# Patient Record
Sex: FEMALE | Race: WHITE | Employment: FULL TIME | URBAN - METROPOLITAN AREA
[De-identification: names, ages, dates, MRNs, and addresses within clinical notes are randomized per-mention and may not be internally consistent; named-entity substitution may affect disease eponyms.]

---

## 2019-05-20 ENCOUNTER — TELEPHONE (OUTPATIENT)
Dept: ORTHOPEDIC SURGERY | Age: 39
End: 2019-05-20

## 2019-05-20 NOTE — TELEPHONE ENCOUNTER
Patient would like to schedule for left foot fx with dr Pito Granado referred by dr Trisha benito. Please call patient.

## 2019-05-29 ENCOUNTER — OFFICE VISIT (OUTPATIENT)
Dept: ORTHOPEDIC SURGERY | Age: 39
End: 2019-05-29
Payer: COMMERCIAL

## 2019-05-29 VITALS
HEART RATE: 75 BPM | BODY MASS INDEX: 22.2 KG/M2 | SYSTOLIC BLOOD PRESSURE: 119 MMHG | HEIGHT: 64 IN | WEIGHT: 130 LBS | RESPIRATION RATE: 16 BRPM | DIASTOLIC BLOOD PRESSURE: 88 MMHG

## 2019-05-29 DIAGNOSIS — M92.72 FREIBERG'S INFRACTION, LEFT: Primary | ICD-10-CM

## 2019-05-29 DIAGNOSIS — M79.672 LEFT FOOT PAIN: ICD-10-CM

## 2019-05-29 PROCEDURE — G8420 CALC BMI NORM PARAMETERS: HCPCS | Performed by: ORTHOPAEDIC SURGERY

## 2019-05-29 PROCEDURE — 99203 OFFICE O/P NEW LOW 30 MIN: CPT | Performed by: ORTHOPAEDIC SURGERY

## 2019-05-29 PROCEDURE — 4004F PT TOBACCO SCREEN RCVD TLK: CPT | Performed by: ORTHOPAEDIC SURGERY

## 2019-05-29 PROCEDURE — G8428 CUR MEDS NOT DOCUMENT: HCPCS | Performed by: ORTHOPAEDIC SURGERY

## 2019-05-29 RX ORDER — LIOTHYRONINE SODIUM 25 UG/1
1 TABLET ORAL
COMMUNITY
Start: 2018-06-28

## 2019-05-29 RX ORDER — LEVOTHYROXINE SODIUM 0.2 MG/1
TABLET ORAL
COMMUNITY
Start: 2016-04-25

## 2019-05-29 RX ORDER — HYDROXYZINE HYDROCHLORIDE 25 MG/1
25 TABLET, FILM COATED ORAL
COMMUNITY
Start: 2017-10-04 | End: 2020-08-28 | Stop reason: ALTCHOICE

## 2019-05-29 RX ORDER — NAPROXEN 500 MG/1
500 TABLET ORAL 2 TIMES DAILY WITH MEALS
Qty: 60 TABLET | Refills: 0 | Status: SHIPPED | OUTPATIENT
Start: 2019-05-29 | End: 2019-06-24 | Stop reason: SDUPTHER

## 2019-05-29 RX ORDER — LITHIUM CARBONATE 300 MG/1
CAPSULE ORAL EVERY EVENING
COMMUNITY
Start: 2016-01-25

## 2019-05-29 RX ORDER — CIPROFLOXACIN 500 MG/1
500 TABLET, FILM COATED ORAL
COMMUNITY
Start: 2017-07-05 | End: 2020-08-28 | Stop reason: ALTCHOICE

## 2019-05-29 RX ORDER — IPRATROPIUM BROMIDE AND ALBUTEROL SULFATE 2.5; .5 MG/3ML; MG/3ML
SOLUTION RESPIRATORY (INHALATION)
COMMUNITY
Start: 2016-11-14

## 2019-05-29 RX ORDER — OXYCODONE HYDROCHLORIDE AND ACETAMINOPHEN 5; 325 MG/1; MG/1
TABLET ORAL
COMMUNITY
Start: 2018-07-09 | End: 2019-06-03

## 2019-05-29 RX ORDER — GABAPENTIN 300 MG/1
CAPSULE ORAL
Status: ON HOLD | COMMUNITY
Start: 2018-06-06 | End: 2020-09-01

## 2019-05-29 RX ORDER — ATORVASTATIN CALCIUM 80 MG/1
TABLET, FILM COATED ORAL
COMMUNITY
Start: 2016-04-25

## 2019-05-29 RX ORDER — LEVONORGESTREL AND ETHINYL ESTRADIOL 0.1-0.02MG
KIT ORAL
COMMUNITY
Start: 2015-07-26 | End: 2020-08-28

## 2019-05-29 RX ORDER — CLONAZEPAM 0.5 MG/1
TABLET ORAL
COMMUNITY
Start: 2016-04-24 | End: 2020-08-28

## 2019-05-29 RX ORDER — LEVOTHYROXINE SODIUM 112 UG/1
112 TABLET ORAL
COMMUNITY
Start: 2014-12-08 | End: 2020-08-28

## 2019-05-29 RX ORDER — DOXEPIN HYDROCHLORIDE 25 MG/1
CAPSULE ORAL
COMMUNITY
Start: 2018-06-27

## 2019-05-29 RX ORDER — FLUTICASONE FUROATE AND VILANTEROL 100; 25 UG/1; UG/1
POWDER RESPIRATORY (INHALATION)
COMMUNITY
Start: 2018-06-29 | End: 2020-08-28 | Stop reason: ALTCHOICE

## 2019-05-29 RX ORDER — GABAPENTIN 100 MG/1
100 CAPSULE ORAL 2 TIMES DAILY
COMMUNITY
Start: 2018-05-08 | End: 2020-08-28

## 2019-05-29 RX ORDER — TRAZODONE HYDROCHLORIDE 100 MG/1
100 TABLET ORAL NIGHTLY
COMMUNITY
Start: 2016-01-25

## 2019-05-29 RX ORDER — LIDOCAINE 50 MG/G
PATCH TOPICAL
COMMUNITY
Start: 2018-04-26

## 2019-05-29 RX ORDER — FOLIC ACID 1 MG/1
2 TABLET ORAL
COMMUNITY
Start: 2015-03-31

## 2019-05-29 RX ORDER — CALCIPOTRIENE 50 UG/G
CREAM TOPICAL
COMMUNITY
Start: 2018-03-23

## 2019-05-29 RX ORDER — LEVOTHYROXINE AND LIOTHYRONINE 57; 13.5 UG/1; UG/1
1 TABLET ORAL
COMMUNITY
Start: 2018-11-08 | End: 2020-08-28 | Stop reason: ALTCHOICE

## 2019-05-29 RX ORDER — ALBUTEROL SULFATE 90 UG/1
AEROSOL, METERED RESPIRATORY (INHALATION)
COMMUNITY
Start: 2014-04-28

## 2019-05-29 RX ORDER — LEVOTHYROXINE SODIUM 300 UG/1
TABLET ORAL
COMMUNITY
Start: 2015-04-05 | End: 2020-08-28 | Stop reason: ALTCHOICE

## 2019-05-29 RX ORDER — BACLOFEN 10 MG/1
10 TABLET ORAL
COMMUNITY
Start: 2017-01-30 | End: 2020-08-28 | Stop reason: ALTCHOICE

## 2019-05-29 RX ORDER — LANOLIN ALCOHOL/MO/W.PET/CERES
1 CREAM (GRAM) TOPICAL
COMMUNITY
Start: 2018-11-18

## 2019-05-29 RX ORDER — LEVETIRACETAM 500 MG/1
TABLET ORAL
COMMUNITY
Start: 2015-03-30

## 2019-05-29 RX ORDER — POTASSIUM CHLORIDE 20 MEQ/1
TABLET, EXTENDED RELEASE ORAL
COMMUNITY
Start: 2019-05-12

## 2019-05-29 SDOH — HEALTH STABILITY: MENTAL HEALTH: HOW OFTEN DO YOU HAVE A DRINK CONTAINING ALCOHOL?: NEVER

## 2019-05-29 NOTE — PROGRESS NOTES
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 HISTORY:  Ms. Hartman Record 44 y.o.  female presents today for the first visit for evaluation of left forefoot pain which started to worsen 1 month ago. She started a new standing job at [employer] when the pain worsened. She initially presented to [facility] where she was Xray'd and instructed to f/u with orthopedics. She is complaining of sharp  pain. Rates pain a 9/10 VAS and is worsening. Pain is increase with standing and walking. No numbness and tingling sensation. No other complaint. No past medical history on file. No past surgical history on file.     Social History     Socioeconomic History    Marital status: Single     Spouse name: Not on file    Number of children: Not on file    Years of education: Not on file    Highest education level: Not on file   Occupational History    Not on file   Social Needs    Financial resource strain: Not on file    Food insecurity:     Worry: Not on file     Inability: Not on file    Transportation needs:     Medical: Not on file     Non-medical: Not on file   Tobacco Use    Smoking status: Current Every Day Smoker    Smokeless tobacco: Never Used   Substance and Sexual Activity    Alcohol use: Never     Frequency: Never    Drug use: Never    Sexual activity: Not on file   Lifestyle    Physical activity:     Days per week: Not on file     Minutes per session: Not on file    Stress: Not on file   Relationships    Social connections:     Talks on phone: Not on file     Gets together: Not on file     Attends Pent[employer]alism service: Not on file     Active member of club or organization: Not on file     Attends meetings of clubs or organizations: Not on file     Relationship status: Not on file    Intimate partner violence:     Fear of current or ex partner: Not on file     Emotionally abused: Not on file     Physically abused: Not on file     Forced sexual activity: Not on file   Other Topics Concern    Not on file   Social History Narrative    Not on file       No family history on file. Current Outpatient Medications on File Prior to Visit   Medication Sig Dispense Refill    albuterol sulfate HFA (VENTOLIN HFA) 108 (90 Base) MCG/ACT inhaler INHALE TWO PUFFS BY MOUTH EVERY 6 HOURS AS NEEDED FOR WHEEZING OR FOR SHORTNESS OF BREATH      atorvastatin (LIPITOR) 80 MG tablet TAKE ONE TABLET BY MOUTH DAILY      baclofen (LIORESAL) 10 MG tablet Take 10 mg by mouth      calcipotriene (DOVONEX) 0.005 % cream APPLY 1-2 GRAMS TOPICALLY TO AFFECTED AREA 1-2 TIMES PER DAY. DO NOT APPLY TO FACE.  ciprofloxacin (CIPRO) 500 MG tablet Take 500 mg by mouth      clonazePAM (KLONOPIN) 0.5 MG tablet TAKE ONE-HALF TO ONE TABLET BY MOUTH UP TO TWO TIMES A DAY AS NEEDED  NO MORE THAN 1.5 TABLETS PER DAY OR 40 PER MONTH      doxepin (SINEQUAN) 25 MG capsule       fluticasone-vilanterol (BREO ELLIPTA) 100-25 MCG/INH AEPB inhaler       folic acid (FOLVITE) 1 MG tablet Take 2 mg by mouth      gabapentin (NEURONTIN) 100 MG capsule Take 100 mg by mouth.  gabapentin (NEURONTIN) 300 MG capsule Take two each evening.       hydrOXYzine (ATARAX) 25 MG tablet Take 25 mg by mouth      ipratropium-albuterol (DUONEB) 0.5-2.5 (3) MG/3ML SOLN nebulizer solution INHALE ONE VIAL VIA NEBULIZER BY MOUTH EVERY 6 HOURS AS NEEDED FOR WHEEZING      levETIRAcetam (KEPPRA) 500 MG tablet TAKE ONE TABLET BY MOUTH TWICE A DAY      levonorgestrel-ethinyl estradiol (ORSYTHIA) 0.1-20 MG-MCG per tablet TAKE ONE TABLET BY MOUTH ONE TIME A DAY FOR IRREGULAR MENSES      levothyroxine (SYNTHROID) 112 MCG tablet Take 112 mcg by mouth      levothyroxine (SYNTHROID) 200 MCG tablet       levothyroxine (SYNTHROID) 300 MCG tablet       lidocaine (LIDODERM) 5 % APPLY ONE PATCH TOPICALLY DAILY FOR 12 HOURS IN A 24 HOUR PERIOD      liothyronine (CYTOMEL) 25 MCG tablet Take 1 tablet by mouth      lithium 300 MG capsule Take by mouth      melatonin 3 MG TABS tablet Take 1 tablet by mouth      oxyCODONE-acetaminophen (PERCOCET) 5-325 MG per tablet Take 1/2 - 1 up to twice daily as needed for severe pain. Should last 30 days      potassium chloride (KLOR-CON M) 20 MEQ extended release tablet TAKE ONE TABLET BY MOUTH TWICE A DAY      thyroid (NP THYROID) 90 MG tablet Take 1 tablet by mouth      traZODone (DESYREL) 100 MG tablet Take 100 mg by mouth       No current facility-administered medications on file prior to visit. Pertinent items are noted in HPI  Review of systems reviewed from Patient History Form dated on 5/29/2019 and available in the patient's chart under the Media tab. No change noted. PHYSICAL EXAMINATION:  Ms. Yamileth La is a very pleasant 44 y.o.  female who presents today in no acute distress, awake, alert, and oriented. She is well dressed, nourished and  groomed. Patient with normal affect. Height is  5' 4\" (1.626 m), weight is 130 lb (59 kg), Body mass index is 22.31 kg/m². Resting respiratory rate is 16. Examination of the gait, showed that the patient walks with a limp with minimal pressure applied to left forefoot.  Examination of both ankles showing a good range of motion.  She has dorsiflexion to about 5 degrees bilaterally, which increased with knee flexion. She has intact sensation and good pedal pulses.  She has good strength in all four planes, including eversion, and has moderate tenderness on deep palpation over the left 2nd metatarsal head, without instability compared to the other side.  The ankles are stable to drawer test bilaterally, ankle reflex 1+ equally bilaterally.       IMAGING: Xray's were reviewed.  3 views of the left foot taken at [facility] on 5/18/2019, and showed no acute fracture. There are 2nd MT head joint changes consistent with Freiberg's infraction.     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 PLAN: I discussed with the patient the treatment options. We recommended stretching exercises of the calf which was taught to the patient today. She will take NSAID as needed. Use soft orthosis, with metatarsal pad. Follow up in 6 weeks and will consider Cortisone injection if needed.        Evi Burton MD

## 2019-05-29 NOTE — LETTER
[facility]  [address]  [address] [place] [address]  Phone: [phone]  Fax: [phone]    Jose Gilmore MD        May 29, 2019     Patient: Maria R Li   YOB: 1980   Date of Visit: 5/29/2019       To Whom It May Concern: It is my medical opinion that Gertrude Salcido may return to work on 06/03/2019 with no restrictions. If you have any questions or concerns, please don't hesitate to call.     Sincerely,    Jose Gilmore MD

## 2019-06-01 PROBLEM — M92.72 FREIBERG'S INFRACTION, LEFT: Status: ACTIVE | Noted: 2019-06-01

## 2019-06-14 ENCOUNTER — TELEPHONE (OUTPATIENT)
Dept: ORTHOPEDIC SURGERY | Age: 39
End: 2019-06-14

## 2019-06-14 NOTE — TELEPHONE ENCOUNTER
Patient was seen 5/29. Patient says it seems her foot has gotten worse not better. Patient says she's been using cold/hot therapy. Patient says her foot is a little swollen and also bruised. Patient has also tried \"exercises\" but her foot is still hurting. Patient has been using the pain medication (Percocet)  prescribed from her internist.     Are there any other exercises, treatment options available? Please advise.

## 2019-06-14 NOTE — TELEPHONE ENCOUNTER
Called and spoke to patient. patient is wanting to proceed with injection appt made for 06/26 at 0830

## 2019-06-24 DIAGNOSIS — M79.672 LEFT FOOT PAIN: ICD-10-CM

## 2019-06-25 RX ORDER — NAPROXEN 500 MG/1
TABLET ORAL
Qty: 60 TABLET | Refills: 0 | Status: SHIPPED | OUTPATIENT
Start: 2019-06-25 | End: 2019-07-23 | Stop reason: SDUPTHER

## 2019-06-26 ENCOUNTER — OFFICE VISIT (OUTPATIENT)
Dept: ORTHOPEDIC SURGERY | Age: 39
End: 2019-06-26
Payer: COMMERCIAL

## 2019-06-26 VITALS
HEIGHT: 64 IN | BODY MASS INDEX: 22.2 KG/M2 | WEIGHT: 130 LBS | DIASTOLIC BLOOD PRESSURE: 72 MMHG | RESPIRATION RATE: 16 BRPM | SYSTOLIC BLOOD PRESSURE: 106 MMHG | HEART RATE: 68 BPM

## 2019-06-26 DIAGNOSIS — M92.72 FREIBERG'S INFRACTION, LEFT: Primary | ICD-10-CM

## 2019-06-26 DIAGNOSIS — M79.672 LEFT FOOT PAIN: ICD-10-CM

## 2019-06-26 PROCEDURE — MISC235 BUDIN SPLINT 1: Performed by: ORTHOPAEDIC SURGERY

## 2019-06-26 PROCEDURE — 99214 OFFICE O/P EST MOD 30 MIN: CPT | Performed by: ORTHOPAEDIC SURGERY

## 2019-06-26 PROCEDURE — G8420 CALC BMI NORM PARAMETERS: HCPCS | Performed by: ORTHOPAEDIC SURGERY

## 2019-06-26 PROCEDURE — 29550 STRAPPING OF TOES: CPT | Performed by: ORTHOPAEDIC SURGERY

## 2019-06-26 PROCEDURE — 4004F PT TOBACCO SCREEN RCVD TLK: CPT | Performed by: ORTHOPAEDIC SURGERY

## 2019-06-26 PROCEDURE — 20600 DRAIN/INJ JOINT/BURSA W/O US: CPT | Performed by: ORTHOPAEDIC SURGERY

## 2019-06-26 PROCEDURE — G8427 DOCREV CUR MEDS BY ELIG CLIN: HCPCS | Performed by: ORTHOPAEDIC SURGERY

## 2019-06-26 RX ORDER — MELOXICAM 7.5 MG/1
7.5 TABLET ORAL DAILY
Qty: 30 TABLET | Refills: 3 | Status: SHIPPED | OUTPATIENT
Start: 2019-06-26 | End: 2020-06-16

## 2019-06-26 NOTE — PROGRESS NOTES
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 HISTORY:  Ms. Janie Ghosh 44 y.o.  female presents today for follow up visit for evaluation of left forefoot pain which started to worsen 1 month ago. She started a new standing job at [employer] when the pain worsened. She initially presented to [facility] where she was Xray'd and instructed to f/u with orthopedics. She is complaining of sharp pain that is worsening. Rates pain a 9/10 VAS and is worsening. Pain is increase with standing and walking. She has failed NSAIDs ice and rest.  No numbness and tingling sensation. No other complaint. History reviewed. No pertinent past medical history. History reviewed. No pertinent surgical history.     Social History     Socioeconomic History    Marital status: Single     Spouse name: Not on file    Number of children: Not on file    Years of education: Not on file    Highest education level: Not on file   Occupational History    Not on file   Social Needs    Financial resource strain: Not on file    Food insecurity:     Worry: Not on file     Inability: Not on file    Transportation needs:     Medical: Not on file     Non-medical: Not on file   Tobacco Use    Smoking status: Current Every Day Smoker    Smokeless tobacco: Never Used   Substance and Sexual Activity    Alcohol use: Never     Frequency: Never    Drug use: Never    Sexual activity: Not on file   Lifestyle    Physical activity:     Days per week: Not on file     Minutes per session: Not on file    Stress: Not on file   Relationships    Social connections:     Talks on phone: Not on file     Gets together: Not on file     Attends Religion service: Not on file     Active member of club or organization: Not on file     Attends meetings of clubs or organizations: Not on file     Relationship status: Not on file    Intimate partner violence:     Fear of current or ex partner: Not on file Emotionally abused: Not on file     Physically abused: Not on file     Forced sexual activity: Not on file   Other Topics Concern    Not on file   Social History Narrative    Not on file       History reviewed. No pertinent family history. Current Outpatient Medications on File Prior to Visit   Medication Sig Dispense Refill    naproxen (NAPROSYN) 500 MG tablet TAKE ONE TABLET BY MOUTH TWICE A DAY WITH MEALS 60 tablet 0    albuterol sulfate HFA (VENTOLIN HFA) 108 (90 Base) MCG/ACT inhaler INHALE TWO PUFFS BY MOUTH EVERY 6 HOURS AS NEEDED FOR WHEEZING OR FOR SHORTNESS OF BREATH      atorvastatin (LIPITOR) 80 MG tablet TAKE ONE TABLET BY MOUTH DAILY      baclofen (LIORESAL) 10 MG tablet Take 10 mg by mouth      calcipotriene (DOVONEX) 0.005 % cream APPLY 1-2 GRAMS TOPICALLY TO AFFECTED AREA 1-2 TIMES PER DAY. DO NOT APPLY TO FACE.  ciprofloxacin (CIPRO) 500 MG tablet Take 500 mg by mouth      clonazePAM (KLONOPIN) 0.5 MG tablet TAKE ONE-HALF TO ONE TABLET BY MOUTH UP TO TWO TIMES A DAY AS NEEDED  NO MORE THAN 1.5 TABLETS PER DAY OR 40 PER MONTH      doxepin (SINEQUAN) 25 MG capsule       fluticasone-vilanterol (BREO ELLIPTA) 100-25 MCG/INH AEPB inhaler       folic acid (FOLVITE) 1 MG tablet Take 2 mg by mouth      gabapentin (NEURONTIN) 100 MG capsule Take 100 mg by mouth.  gabapentin (NEURONTIN) 300 MG capsule Take two each evening.       hydrOXYzine (ATARAX) 25 MG tablet Take 25 mg by mouth      ipratropium-albuterol (DUONEB) 0.5-2.5 (3) MG/3ML SOLN nebulizer solution INHALE ONE VIAL VIA NEBULIZER BY MOUTH EVERY 6 HOURS AS NEEDED FOR WHEEZING      levETIRAcetam (KEPPRA) 500 MG tablet TAKE ONE TABLET BY MOUTH TWICE A DAY      levonorgestrel-ethinyl estradiol (ORSYTHIA) 0.1-20 MG-MCG per tablet TAKE ONE TABLET BY MOUTH ONE TIME A DAY FOR IRREGULAR MENSES      levothyroxine (SYNTHROID) 112 MCG tablet Take 112 mcg by mouth      levothyroxine (SYNTHROID) 200 MCG tablet       levothyroxine (SYNTHROID) 300 MCG tablet       lidocaine (LIDODERM) 5 % APPLY ONE PATCH TOPICALLY DAILY FOR 12 HOURS IN A 24 HOUR PERIOD      liothyronine (CYTOMEL) 25 MCG tablet Take 1 tablet by mouth      lithium 300 MG capsule Take by mouth      melatonin 3 MG TABS tablet Take 1 tablet by mouth      potassium chloride (KLOR-CON M) 20 MEQ extended release tablet TAKE ONE TABLET BY MOUTH TWICE A DAY      thyroid (NP THYROID) 90 MG tablet Take 1 tablet by mouth      traZODone (DESYREL) 100 MG tablet Take 100 mg by mouth       No current facility-administered medications on file prior to visit. Pertinent items are noted in HPI  Review of systems reviewed from Patient History Form dated on 5/29/2019 and available in the patient's chart under the Media tab. No change noted. PHYSICAL EXAMINATION:  Ms. Ammon Williamson is a very pleasant 44 y.o.  female who presents today in no acute distress, awake, alert, and oriented. She is well dressed, nourished and  groomed. Patient with normal affect. Height is  5' 4\" (1.626 m), weight is 130 lb (59 kg), Body mass index is 22.31 kg/m². Resting respiratory rate is 16. Examination of the gait, showed that the patient walks with a limp with minimal pressure applied to left forefoot.  Examination of both ankles showing a good range of motion.  She has dorsiflexion to about 5 degrees bilaterally, which increased with knee flexion. She has intact sensation and good pedal pulses.  She has good strength in all four planes, including eversion, and has moderate tenderness on deep palpation over the left 2nd metatarsal head, without instability compared to the other side.  The ankles are stable to drawer test bilaterally, ankle reflex 1+ equally bilaterally.       IMAGING: Xray's were reviewed.  3 views of the left foot taken at [facility] on 5/18/2019, and showed no acute fracture. There are 2nd MT head joint changes consistent with Freiberg's infraction.     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 PLAN: I discussed with the patient the treatment options. We recommended stretching exercises of the calf which was taught to the patient today. She will take NSAID as needed. Use soft orthosis, with metatarsal pad and budin splint. Recommend Cortisone injection 2nd MPJ and she agreed to proceed. PROCEDURE:    With the patient's permission, and under sterile condition, the left 2nd toe MP joint area was prepared and draped with alcohol and injected with a mixture of 0.5 ml Kenalog 40mg and 0.5 ml of 1% lidocaine. The patient tolerated the procedure well. A band-aid was applied and the patient was advised to ice the 2nd toe for 15-20 minutes to relieve any injection site related pain. She reported a good improvement immediatly after the injection. PROCEDURE:    With the patient's permission, the left second toe was strapped with Budin splint. The patient tolerated the procedure well. Procedures    BreAPJeT Budin Splint 1 $8     Patient was supplied a  Rezora Budin Splint 1. This retail item was supplied to provide functional support of the affected area. Verbal and written instructions for the use of and application of this item were provided. The patient was educated and fit by a healthcare professional with expert knowledge and specialization in brace application. They were instructed to contact the office immediately should the equipment result in increased pain, decreased sensation, increased swelling or worsening of the condition.       NJ STRAPPING OF TOES    NJ TRIAMCINOLONE ACETONIDE INJ    NJ ARTHROCENTESIS ASPIR&/INJ SMALL JT/BURSA W/O Shavon Hung MD

## 2019-06-26 NOTE — LETTER
[facility]  [address]  [address] [facility] [address]  Phone: [phone]  Fax: [phone]    Mark Mattson MD        June 26, 2019     Patient: Carly Allen   YOB: 1980   Date of Visit: 6/26/2019       To Whom It May Concern: It is my medical opinion that Selin Norton may return to work on 06/27/2019 with frequent 10 minute sit down breaks throughout the day for the next 4 weeks. She was also seen in our office on 06/26/2019    If you have any questions or concerns, please don't hesitate to call.     Sincerely,    Mark Mattson MD

## 2019-07-23 DIAGNOSIS — M79.672 LEFT FOOT PAIN: ICD-10-CM

## 2019-07-23 RX ORDER — NAPROXEN 500 MG/1
TABLET ORAL
Qty: 60 TABLET | Refills: 0 | Status: SHIPPED | OUTPATIENT
Start: 2019-07-23 | End: 2019-08-20 | Stop reason: SDUPTHER

## 2019-08-20 DIAGNOSIS — M79.672 LEFT FOOT PAIN: ICD-10-CM

## 2019-08-20 RX ORDER — NAPROXEN 500 MG/1
TABLET ORAL
Qty: 60 TABLET | Refills: 0 | Status: SHIPPED | OUTPATIENT
Start: 2019-08-20 | End: 2019-10-05 | Stop reason: SDUPTHER

## 2019-10-02 ENCOUNTER — OFFICE VISIT (OUTPATIENT)
Dept: ORTHOPEDIC SURGERY | Age: 39
End: 2019-10-02
Payer: COMMERCIAL

## 2019-10-02 VITALS — HEIGHT: 64 IN | HEART RATE: 68 BPM | RESPIRATION RATE: 16 BRPM | WEIGHT: 130 LBS | BODY MASS INDEX: 22.2 KG/M2

## 2019-10-02 DIAGNOSIS — M92.72 FREIBERG'S INFRACTION, LEFT: Primary | ICD-10-CM

## 2019-10-02 PROCEDURE — G8484 FLU IMMUNIZE NO ADMIN: HCPCS | Performed by: ORTHOPAEDIC SURGERY

## 2019-10-02 PROCEDURE — 99214 OFFICE O/P EST MOD 30 MIN: CPT | Performed by: ORTHOPAEDIC SURGERY

## 2019-10-02 PROCEDURE — G8427 DOCREV CUR MEDS BY ELIG CLIN: HCPCS | Performed by: ORTHOPAEDIC SURGERY

## 2019-10-02 PROCEDURE — 20600 DRAIN/INJ JOINT/BURSA W/O US: CPT | Performed by: ORTHOPAEDIC SURGERY

## 2019-10-02 PROCEDURE — G8420 CALC BMI NORM PARAMETERS: HCPCS | Performed by: ORTHOPAEDIC SURGERY

## 2019-10-02 PROCEDURE — 4004F PT TOBACCO SCREEN RCVD TLK: CPT | Performed by: ORTHOPAEDIC SURGERY

## 2019-10-23 ENCOUNTER — TELEPHONE (OUTPATIENT)
Dept: ORTHOPEDIC SURGERY | Age: 39
End: 2019-10-23

## 2019-12-04 DIAGNOSIS — M79.672 LEFT FOOT PAIN: ICD-10-CM

## 2019-12-04 RX ORDER — NAPROXEN 500 MG/1
TABLET ORAL
Qty: 4 TABLET | Refills: 0 | Status: SHIPPED | OUTPATIENT
Start: 2019-12-04 | End: 2020-06-16

## 2020-01-07 ENCOUNTER — TELEPHONE (OUTPATIENT)
Dept: ORTHOPEDIC SURGERY | Age: 40
End: 2020-01-07

## 2020-05-29 ENCOUNTER — OFFICE VISIT (OUTPATIENT)
Dept: ORTHOPEDIC SURGERY | Age: 40
End: 2020-05-29
Payer: COMMERCIAL

## 2020-05-29 VITALS — WEIGHT: 132 LBS | RESPIRATION RATE: 16 BRPM | HEIGHT: 64 IN | BODY MASS INDEX: 22.53 KG/M2 | TEMPERATURE: 96.4 F

## 2020-05-29 PROCEDURE — G8420 CALC BMI NORM PARAMETERS: HCPCS | Performed by: ORTHOPAEDIC SURGERY

## 2020-05-29 PROCEDURE — 4004F PT TOBACCO SCREEN RCVD TLK: CPT | Performed by: ORTHOPAEDIC SURGERY

## 2020-05-29 PROCEDURE — 99214 OFFICE O/P EST MOD 30 MIN: CPT | Performed by: ORTHOPAEDIC SURGERY

## 2020-05-29 PROCEDURE — G8427 DOCREV CUR MEDS BY ELIG CLIN: HCPCS | Performed by: ORTHOPAEDIC SURGERY

## 2020-05-29 RX ORDER — MELOXICAM 7.5 MG/1
7.5 TABLET ORAL DAILY
Qty: 30 TABLET | Refills: 0 | Status: SHIPPED | OUTPATIENT
Start: 2020-05-29 | End: 2020-06-16

## 2020-05-29 NOTE — PROGRESS NOTES
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 HISTORY:  Ms. Gerhard Becker 36 y.o.  female presents today for follow up visit for evaluation of left forefoot pain which started to worsen. She had a cortisone injection on 10/2/2019 with good relief until recent.  She works a standing job at [employer] when the pain worsened. She initially presented to [facility] where she was Xray'd and instructed to f/u with orthopedics. She is complaining of sharp pain that is worsening. Rates pain a 10/10 VAS achy and intermittent and is now swelling. Pain is increase with standing and walking. She has failed NSAIDs ice and rest.  No numbness and tingling sensation. No other complaint. No past medical history on file. No past surgical history on file.     Social History     Socioeconomic History    Marital status: Single     Spouse name: Not on file    Number of children: Not on file    Years of education: Not on file    Highest education level: Not on file   Occupational History    Not on file   Social Needs    Financial resource strain: Not on file    Food insecurity     Worry: Not on file     Inability: Not on file    Transportation needs     Medical: Not on file     Non-medical: Not on file   Tobacco Use    Smoking status: Current Every Day Smoker    Smokeless tobacco: Never Used   Substance and Sexual Activity    Alcohol use: Never     Frequency: Never    Drug use: Never    Sexual activity: Not on file   Lifestyle    Physical activity     Days per week: Not on file     Minutes per session: Not on file    Stress: Not on file   Relationships    Social connections     Talks on phone: Not on file     Gets together: Not on file     Attends Muslim service: Not on file     Active member of club or organization: Not on file     Attends meetings of clubs or organizations: Not on file     Relationship status: Not on file    Intimate partner violence     Fear tablet Take 112 mcg by mouth      levothyroxine (SYNTHROID) 200 MCG tablet       levothyroxine (SYNTHROID) 300 MCG tablet       lidocaine (LIDODERM) 5 % APPLY ONE PATCH TOPICALLY DAILY FOR 12 HOURS IN A 24 HOUR PERIOD      liothyronine (CYTOMEL) 25 MCG tablet Take 1 tablet by mouth      lithium 300 MG capsule Take by mouth      melatonin 3 MG TABS tablet Take 1 tablet by mouth      potassium chloride (KLOR-CON M) 20 MEQ extended release tablet TAKE ONE TABLET BY MOUTH TWICE A DAY      thyroid (NP THYROID) 90 MG tablet Take 1 tablet by mouth      traZODone (DESYREL) 100 MG tablet Take 100 mg by mouth       No current facility-administered medications on file prior to visit. Pertinent items are noted in HPI  Review of systems reviewed from Patient History Form dated on 5/29/2020 and available in the patient's chart under the Media tab. No change noted. PHYSICAL EXAMINATION:  Ms. Man Alexander is a very pleasant 36 y.o.  female who presents today in no acute distress, awake, alert, and oriented. She is well dressed, nourished and  groomed. Patient with normal affect. Height is  5' 4\" (1.626 m), weight is 132 lb (59.9 kg), Body mass index is 22.66 kg/m². Resting respiratory rate is 16. Examination of the gait, showed that the patient walks with a limp with minimal pressure applied to left forefoot.  Examination of both ankles showing a good range of motion.  She has dorsiflexion to about 5 degrees bilaterally, which increased with knee flexion. She has intact sensation and good pedal pulses.  She has good strength in all four planes, including eversion, and has moderate tenderness on deep palpation over the left 2nd metatarsal head, without instability compared to the other side. There is mild swelling over the anterior 2nd MT head.  The ankles are stable to drawer test bilaterally, ankle reflex 1+ equally bilaterally.       IMAGING: Xray's were reviewed.  3 views of the left foot taken

## 2020-06-11 ENCOUNTER — TELEPHONE (OUTPATIENT)
Dept: ORTHOPEDIC SURGERY | Age: 40
End: 2020-06-11

## 2020-06-11 NOTE — TELEPHONE ENCOUNTER
Pt called; has questions about a cane. Wants to know do she need a Rx for it. Please advise.      Call [phone]

## 2020-06-16 ENCOUNTER — TELEPHONE (OUTPATIENT)
Dept: ORTHOPEDIC SURGERY | Age: 40
End: 2020-06-16

## 2020-06-16 RX ORDER — NAPROXEN 500 MG/1
500 TABLET ORAL 2 TIMES DAILY WITH MEALS
Qty: 60 TABLET | Refills: 0 | Status: SHIPPED | OUTPATIENT
Start: 2020-06-16 | End: 2020-08-28 | Stop reason: ALTCHOICE

## 2020-06-18 ENCOUNTER — TELEPHONE (OUTPATIENT)
Dept: ORTHOPEDIC SURGERY | Age: 40
End: 2020-06-18

## 2020-06-26 ENCOUNTER — TELEPHONE (OUTPATIENT)
Dept: ORTHOPEDIC SURGERY | Age: 40
End: 2020-06-26

## 2020-06-26 NOTE — TELEPHONE ENCOUNTER
Spoke with patient. She is seeing Dr. Maco Canales for her foot and is needing her records. Let her know she will need to sign a release of information form, which will be faxed to [phone] so she/Dr. Refugio Jorge can receive her records. Patient will call us back with the fax number that we can fax the release of information form for her to complete.

## 2020-06-26 NOTE — TELEPHONE ENCOUNTER
PATIENT RETURNING CALL. [facility] SHE SAW DR Arturo Bailey AND HE PUT HER IN A BOOT.     [phone]

## 2020-06-26 NOTE — TELEPHONE ENCOUNTER
Called and left message for patient. **patient has not given us permission to speak to or release information to Dr. Brenda Dumont office. Patient will need to sign a release of information form, which will then be faxed to [phone] so she can receive her records. **

## 2020-06-26 NOTE — TELEPHONE ENCOUNTER
refaxed formed and informed patient. Patient was able to get the form while on the phone with her.  Please see previous note that indicated next steps makeda talked to patient about

## 2020-07-21 ENCOUNTER — TELEPHONE (OUTPATIENT)
Dept: ORTHOPEDIC SURGERY | Age: 40
End: 2020-07-21

## 2020-07-21 NOTE — TELEPHONE ENCOUNTER
Pt called; wants to know do she needs to keep her appt on 7/29/20. Please advise.      Call [phone] to discuss

## 2020-07-21 NOTE — TELEPHONE ENCOUNTER
Called and spoke to [facility], she stated she is seeing another provider and having foot surgery September 1st. appt has been canceled

## 2020-08-24 NOTE — PROGRESS NOTES
[facility] ([facility]) [address], [address]    Acknowledgment of Informed Consent for Surgical or Medical Procedure and Sedation  I agree to allow doctor(s) [facility] and his/her associates or assistants, including residents and/or other qualified medical practitioner to perform the following medical treatment or procedure and to administer or direct the administration of sedation as necessary:  Procedure(s): CARTIVA IMPLANT LEFT 2ND METATARSAL HEAD, SYNOVECTOMY LEFT 2ND METATARSAL PHALANGEAL HEAD  My doctor has explained the following regarding the proposed procedure:   the explanation of the procedure   the benefits of the procedure   the potential problems that might occur during recuperation   the risks and side effects of the procedure which could include but are not limited to severe blood loss, infection, stroke or death   the benefits, risks and side effect of alternative procedures including the consequences of declining this procedure or any alternative procedures   the likelihood of achieving satisfactory results. I acknowledge no guarantee or assurance has been made to me regarding the results. I understand that during the course of this treatment/procedure, unforeseen conditions can occur which require an additional or different procedure. I agree to allow my physician or assistants to perform such extension of the original procedure as they may find necessary. I understand that sedation will often result in temporary impairment of memory and fine motor skills and that sedation can occasionally progress to a state of deep sedation or general anesthesia. I understand the risks of anesthesia for surgery include, but are not limited to, sore throat, hoarseness, injury to face, mouth, or teeth; nausea; headache; injury to blood vessels or nerves; death, brain damage, or paralysis.     I understand that if I have a Limitation of Treatment order in

## 2020-08-26 ENCOUNTER — NURSE ONLY (OUTPATIENT)
Dept: PRIMARY CARE CLINIC | Age: 40
End: 2020-08-26
Payer: COMMERCIAL

## 2020-08-26 PROCEDURE — 99211 OFF/OP EST MAY X REQ PHY/QHP: CPT | Performed by: NURSE PRACTITIONER

## 2020-08-27 LAB — SARS-COV-2, NAA: ABNORMAL

## 2020-08-28 ENCOUNTER — NURSE ONLY (OUTPATIENT)
Dept: PRIMARY CARE CLINIC | Age: 40
End: 2020-08-28
Payer: COMMERCIAL

## 2020-08-28 PROCEDURE — 99211 OFF/OP EST MAY X REQ PHY/QHP: CPT | Performed by: NURSE PRACTITIONER

## 2020-08-28 RX ORDER — OXYCODONE HYDROCHLORIDE AND ACETAMINOPHEN 5; 325 MG/1; MG/1
1 TABLET ORAL EVERY 8 HOURS PRN
COMMUNITY

## 2020-08-28 RX ORDER — MEDROXYPROGESTERONE ACETATE 150 MG/ML
150 INJECTION, SUSPENSION INTRAMUSCULAR
COMMUNITY

## 2020-08-28 RX ORDER — CYANOCOBALAMIN (VITAMIN B-12) 500 MCG
TABLET ORAL DAILY
COMMUNITY

## 2020-08-28 NOTE — PROGRESS NOTES
[facility] PRE-SURGICAL TESTING INSTRUCTIONS                              PRIOR TO PROCEDURE DATE:  1. Please follow any guidelines/instructions prior to your procedure as advised by your surgeon. 2. Arrange for someone to drive you home and be with you for the first 24 hours after discharge for your safety after your procedure for which you received sedation. Ensure it is someone we can share information with regarding your discharge. 3. You must contact your surgeon for instructions IF:   You are taking any blood thinners, aspirin, anti-inflammatory or vitamin E.   There is a change in your physical condition such as a cold, fever, rash, cuts, sores or any other infection, especially near your surgical site. 4. Do not drink alcohol the day before or day of your procedure. 5. A Pre-op History and Physical for surgery MUST be completed by your Physician or Urgent Care within 30 days of your procedure date. Please bring a copy with you on the day of your procedure and along with any other testing performed. THE DAY OF YOUR PROCEDURE:  1. Follow instructions for ARRIVAL TIME as DIRECTED BY YOUR SURGEON. I    2. Enter the MAIN entrance from [address] and follow the signs to the free [employer] or [employer] parking (offered free of charge 6am-5pm). 3. Enter the Main Entrance of the hospital (do not enter from the lower level of the parking garage). Upon entrance, check in with the  at the main desk on your left. If no one is available at the desk, proceed into the [facility] Waiting Room and go through the door directly into the [facility]. There is a Check-in desk ACROSS from Room 5 (marked with a sign hanging from the ceiling). The phone number for the surgery center is [phone]. 4. Please call [phone] option #2 option #2 if you have not been preregistered yet. On the day of your procedure bring your insurance card and photo ID.  You will be registered at your bedside once brought back to your room. 5. DO NOT EAT ANYTHING eight hours prior to surgery. May have 8 ounces of water 4 hours prior to surgery. 6. MEDICATIONS    Take the following medications with a SMALL sip of water: KEPPRA, AND LEVOTHYROXINE, AND CYTOMEL. MAY HAVE KLONOPIN, GABAPENTIN, AND PERCOCET    Use your usual dose of inhalers the morning of surgery. BRING your rescue inhaler with you to hospital.    Anesthesia does NOT want you to take insulin the morning of surgery. They will control your blood sugar while you are at the hospital. Please contact your ordering physician for instructions regarding your insulin the night before your procedure. If you have an insulin pump, please keep it set on basal rate. 7. Do not swallow water when brushing teeth. No gum, candy, mints or ice chips. Refrain from smoking or at least decrease the amount. 8. Dress in loose, comfortable clothing appropriate for redressing after your procedure. Do not wear jewelry (including body piercings), make-up (especially NO eye make-up), fingernail polish (NO toenail polish if foot/leg surgery), lotion, powders or metal hairclips. 9. Dentures, glasses, or contacts will need to be removed before your procedure. Bring cases for your glasses, contacts, dentures, or hearing aids to protect them while you are in surgery. 10. If you use a CPAP, please bring it with you on the day of your procedure. 11. We recommend that valuable personal  belongings such as cash, cell phones, e-tablets or jewelry, be left at home during your stay. The hospital will not be responsible for valuables that are not secured in the hospital safe. However, if your insurance requires a co-pay, you may want to bring a method of payment, i.e. Check or credit card, if you wish to pay your co-pay the day of surgery. 12. If you are to stay overnight, you may bring a bag with personal items.  Please have any large items you may need brought in by your family after your arrival to your hospital room. 15. If you have a Living Will or Durable Power of , please bring a copy on the day of your procedure. 15. With your permission, one family member may accompany you while you are being prepared for surgery. Once you are ready, additional family members may join you. HOW WE KEEP YOU SAFE and WORK TO PREVENT SURGICAL SITE INFECTIONS:  1. Health care workers should always check your ID bracelet to verify your name and birth date. You will be asked many times to state your name, date of birth, and allergies. 2. Health care workers should always clean their hands with soap or alcohol gel before providing care to you. It is okay to ask anyone if they cleaned their hands before they touch you. 3. You will be actively involved in verifying the type of procedure you are having and ensuring the correct surgical site. This will be confirmed multiple times prior to your procedure. Do NOT mirta your surgery site UNLESS instructed to by your surgeon. 4. Do not shave or wax for 72 hours prior to procedure near your operative site. Shaving with a razor can irritate your skin and make it easier to develop an infection. On the day of your procedure, any hair that needs to be removed near the surgical site will be clipped by a healthcare worker using a special clippers designed to avoid skin irritation. 5. When you are in the operating room, your surgical site will be cleansed with a special soap, and in most cases, you will be given an antibiotic before the surgery begins. What to expect AFTER YOUR PROCEDURE:  1. Immediately following your procedure, your will be taken to the PACU for the first phase of your recovery. Your nurse will help you recover from any potential side effects of anesthesia, such as extreme drowsiness, changes in your vital signs or breathing patterns.  Nausea, headache, muscle aches, or sore throat may also occur after anesthesia. Your nurse will help you manage these potential side effects. 2. For comfort and safety, arrange to have someone at home with you for the first 24 hours after discharge. 3. You and your family will be given written instructions about your diet, activity, dressing care, medications, and return visits. 4. Once at home, should issues with nausea, pain, or bleeding occur, or should you notice any signs of infection, you should call your surgeon. 5. Always clean your hands before and after caring for your wound. Do not let your family touch your surgery site without cleaning their hands. 6. Narcotic pain medications can cause significant constipation. You may want to add a stool softener to your postoperative medication schedule or speak to your surgeon on how best to manage this SIDE EFFECT. SPECIAL INSTRUCTIONS     Thank you for allowing us to care for you. We strive to exceed your expectations in the delivery of care and service provided to you and your family. If you need to contact us for any reason, please call us at [phone]    Instructions reviewed with patient during preadmission testing phone interview. Elizabeth Lomax. 8/28/2020 .4:29 PM      ADDITIONAL EDUCATIONAL INFORMATION REVIEWED PER PHONE WITH YOU AND/OR YOUR FAMILY:  Yes Bring a urine sample on day of surgery  ANTIBACTERIAL SOAP

## 2020-08-29 LAB — SARS-COV-2, NAA: NOT DETECTED

## 2020-08-31 ENCOUNTER — ANESTHESIA EVENT (OUTPATIENT)
Dept: OPERATING ROOM | Age: 40
End: 2020-08-31
Payer: COMMERCIAL

## 2020-09-01 ENCOUNTER — APPOINTMENT (OUTPATIENT)
Dept: GENERAL RADIOLOGY | Age: 40
End: 2020-09-01
Attending: PODIATRIST
Payer: COMMERCIAL

## 2020-09-01 ENCOUNTER — HOSPITAL ENCOUNTER (OUTPATIENT)
Age: 40
Setting detail: OUTPATIENT SURGERY
Discharge: HOME | End: 2020-09-01
Attending: PODIATRIST | Admitting: PODIATRIST
Payer: COMMERCIAL

## 2020-09-01 ENCOUNTER — ANESTHESIA (OUTPATIENT)
Dept: OPERATING ROOM | Age: 40
End: 2020-09-01
Payer: COMMERCIAL

## 2020-09-01 VITALS — OXYGEN SATURATION: 100 % | SYSTOLIC BLOOD PRESSURE: 117 MMHG | DIASTOLIC BLOOD PRESSURE: 75 MMHG

## 2020-09-01 VITALS
RESPIRATION RATE: 14 BRPM | BODY MASS INDEX: 22.36 KG/M2 | OXYGEN SATURATION: 100 % | TEMPERATURE: 97.5 F | WEIGHT: 131 LBS | SYSTOLIC BLOOD PRESSURE: 121 MMHG | DIASTOLIC BLOOD PRESSURE: 83 MMHG | HEIGHT: 64 IN | HEART RATE: 66 BPM

## 2020-09-01 LAB
GLUCOSE BLD-MCNC: 59 MG/DL (ref 70–99)
PERFORMED ON: ABNORMAL
PREGNANCY, URINE: NEGATIVE

## 2020-09-01 PROCEDURE — 2709999900 HC NON-CHARGEABLE SUPPLY: Performed by: PODIATRIST

## 2020-09-01 PROCEDURE — 7100000010 HC PHASE II RECOVERY - FIRST 15 MIN: Performed by: PODIATRIST

## 2020-09-01 PROCEDURE — 3600000004 HC SURGERY LEVEL 4 BASE: Performed by: PODIATRIST

## 2020-09-01 PROCEDURE — 2500000003 HC RX 250 WO HCPCS: Performed by: PODIATRIST

## 2020-09-01 PROCEDURE — 7100000011 HC PHASE II RECOVERY - ADDTL 15 MIN: Performed by: PODIATRIST

## 2020-09-01 PROCEDURE — 7100000000 HC PACU RECOVERY - FIRST 15 MIN: Performed by: PODIATRIST

## 2020-09-01 PROCEDURE — 7100000001 HC PACU RECOVERY - ADDTL 15 MIN: Performed by: PODIATRIST

## 2020-09-01 PROCEDURE — 2580000003 HC RX 258: Performed by: ANESTHESIOLOGY

## 2020-09-01 PROCEDURE — 3700000001 HC ADD 15 MINUTES (ANESTHESIA): Performed by: PODIATRIST

## 2020-09-01 PROCEDURE — 3600000014 HC SURGERY LEVEL 4 ADDTL 15MIN: Performed by: PODIATRIST

## 2020-09-01 PROCEDURE — 6360000002 HC RX W HCPCS: Performed by: NURSE ANESTHETIST, CERTIFIED REGISTERED

## 2020-09-01 PROCEDURE — 6360000002 HC RX W HCPCS: Performed by: PODIATRIST

## 2020-09-01 PROCEDURE — 73630 X-RAY EXAM OF FOOT: CPT

## 2020-09-01 PROCEDURE — C1776 JOINT DEVICE (IMPLANTABLE): HCPCS | Performed by: PODIATRIST

## 2020-09-01 PROCEDURE — 84703 CHORIONIC GONADOTROPIN ASSAY: CPT

## 2020-09-01 PROCEDURE — 2500000003 HC RX 250 WO HCPCS: Performed by: NURSE ANESTHETIST, CERTIFIED REGISTERED

## 2020-09-01 PROCEDURE — 3700000000 HC ANESTHESIA ATTENDED CARE: Performed by: PODIATRIST

## 2020-09-01 PROCEDURE — 2580000003 HC RX 258: Performed by: PODIATRIST

## 2020-09-01 RX ORDER — MAGNESIUM HYDROXIDE 1200 MG/15ML
LIQUID ORAL CONTINUOUS PRN
Status: COMPLETED | OUTPATIENT
Start: 2020-09-01 | End: 2020-09-01

## 2020-09-01 RX ORDER — FENTANYL CITRATE 50 UG/ML
25 INJECTION, SOLUTION INTRAMUSCULAR; INTRAVENOUS EVERY 5 MIN PRN
Status: DISCONTINUED | OUTPATIENT
Start: 2020-09-01 | End: 2020-09-01 | Stop reason: HOSPADM

## 2020-09-01 RX ORDER — PROPOFOL 10 MG/ML
INJECTION, EMULSION INTRAVENOUS PRN
Status: DISCONTINUED | OUTPATIENT
Start: 2020-09-01 | End: 2020-09-01 | Stop reason: SDUPTHER

## 2020-09-01 RX ORDER — SODIUM CHLORIDE, SODIUM LACTATE, POTASSIUM CHLORIDE, CALCIUM CHLORIDE 600; 310; 30; 20 MG/100ML; MG/100ML; MG/100ML; MG/100ML
INJECTION, SOLUTION INTRAVENOUS CONTINUOUS
Status: DISCONTINUED | OUTPATIENT
Start: 2020-09-01 | End: 2020-09-01 | Stop reason: HOSPADM

## 2020-09-01 RX ORDER — HYDROCODONE BITARTRATE AND ACETAMINOPHEN 5; 325 MG/1; MG/1
1 TABLET ORAL EVERY 6 HOURS PRN
Qty: 10 TABLET | Refills: 0 | Status: SHIPPED | OUTPATIENT
Start: 2020-09-01 | End: 2020-09-04

## 2020-09-01 RX ORDER — MIDAZOLAM HYDROCHLORIDE 1 MG/ML
INJECTION INTRAMUSCULAR; INTRAVENOUS PRN
Status: DISCONTINUED | OUTPATIENT
Start: 2020-09-01 | End: 2020-09-01 | Stop reason: SDUPTHER

## 2020-09-01 RX ORDER — SODIUM CHLORIDE 0.9 % (FLUSH) 0.9 %
10 SYRINGE (ML) INJECTION EVERY 12 HOURS SCHEDULED
Status: DISCONTINUED | OUTPATIENT
Start: 2020-09-01 | End: 2020-09-01 | Stop reason: HOSPADM

## 2020-09-01 RX ORDER — OXYCODONE HYDROCHLORIDE AND ACETAMINOPHEN 5; 325 MG/1; MG/1
1 TABLET ORAL EVERY 6 HOURS PRN
Qty: 12 TABLET | Refills: 0 | Status: SHIPPED | OUTPATIENT
Start: 2020-09-01 | End: 2020-09-04

## 2020-09-01 RX ORDER — PROPOFOL 10 MG/ML
INJECTION, EMULSION INTRAVENOUS CONTINUOUS PRN
Status: DISCONTINUED | OUTPATIENT
Start: 2020-09-01 | End: 2020-09-01 | Stop reason: SDUPTHER

## 2020-09-01 RX ORDER — DOXYCYCLINE HYCLATE 100 MG
100 TABLET ORAL 2 TIMES DAILY
Qty: 14 TABLET | Refills: 0 | Status: SHIPPED | OUTPATIENT
Start: 2020-09-01 | End: 2020-09-08

## 2020-09-01 RX ORDER — SODIUM CHLORIDE 0.9 % (FLUSH) 0.9 %
10 SYRINGE (ML) INJECTION PRN
Status: DISCONTINUED | OUTPATIENT
Start: 2020-09-01 | End: 2020-09-01 | Stop reason: HOSPADM

## 2020-09-01 RX ORDER — LIDOCAINE HYDROCHLORIDE 20 MG/ML
INJECTION, SOLUTION INTRAVENOUS PRN
Status: DISCONTINUED | OUTPATIENT
Start: 2020-09-01 | End: 2020-09-01 | Stop reason: SDUPTHER

## 2020-09-01 RX ORDER — BUPIVACAINE HYDROCHLORIDE 5 MG/ML
INJECTION, SOLUTION EPIDURAL; INTRACAUDAL PRN
Status: DISCONTINUED | OUTPATIENT
Start: 2020-09-01 | End: 2020-09-01 | Stop reason: ALTCHOICE

## 2020-09-01 RX ORDER — ONDANSETRON 2 MG/ML
4 INJECTION INTRAMUSCULAR; INTRAVENOUS
Status: DISCONTINUED | OUTPATIENT
Start: 2020-09-01 | End: 2020-09-01 | Stop reason: HOSPADM

## 2020-09-01 RX ORDER — DEXAMETHASONE SODIUM PHOSPHATE 4 MG/ML
INJECTION, SOLUTION INTRA-ARTICULAR; INTRALESIONAL; INTRAMUSCULAR; INTRAVENOUS; SOFT TISSUE PRN
Status: DISCONTINUED | OUTPATIENT
Start: 2020-09-01 | End: 2020-09-01 | Stop reason: ALTCHOICE

## 2020-09-01 RX ORDER — GLYCOPYRROLATE 1 MG/5 ML
SYRINGE (ML) INTRAVENOUS PRN
Status: DISCONTINUED | OUTPATIENT
Start: 2020-09-01 | End: 2020-09-01 | Stop reason: SDUPTHER

## 2020-09-01 RX ORDER — 0.9 % SODIUM CHLORIDE 0.9 %
500 INTRAVENOUS SOLUTION INTRAVENOUS
Status: DISCONTINUED | OUTPATIENT
Start: 2020-09-01 | End: 2020-09-01 | Stop reason: HOSPADM

## 2020-09-01 RX ADMIN — SODIUM CHLORIDE, SODIUM LACTATE, POTASSIUM CHLORIDE, AND CALCIUM CHLORIDE: 600; 310; 30; 20 INJECTION, SOLUTION INTRAVENOUS at 07:39

## 2020-09-01 RX ADMIN — PROPOFOL 50 MG: 10 INJECTION, EMULSION INTRAVENOUS at 08:01

## 2020-09-01 RX ADMIN — CEFAZOLIN 2 G: 10 INJECTION, POWDER, FOR SOLUTION INTRAVENOUS at 07:52

## 2020-09-01 RX ADMIN — LIDOCAINE HYDROCHLORIDE 50 MG: 20 INJECTION, SOLUTION INTRAVENOUS at 08:01

## 2020-09-01 RX ADMIN — PROPOFOL 75 MCG/KG/MIN: 10 INJECTION, EMULSION INTRAVENOUS at 08:01

## 2020-09-01 RX ADMIN — Medication 0.2 MG: at 09:00

## 2020-09-01 RX ADMIN — MIDAZOLAM HYDROCHLORIDE 2 MG: 2 INJECTION, SOLUTION INTRAMUSCULAR; INTRAVENOUS at 07:51

## 2020-09-01 RX ADMIN — SODIUM CHLORIDE, SODIUM LACTATE, POTASSIUM CHLORIDE, AND CALCIUM CHLORIDE: 600; 310; 30; 20 INJECTION, SOLUTION INTRAVENOUS at 07:40

## 2020-09-01 ASSESSMENT — PULMONARY FUNCTION TESTS
PIF_VALUE: 0
PIF_VALUE: 1
PIF_VALUE: 0
PIF_VALUE: 1
PIF_VALUE: 0
PIF_VALUE: 1
PIF_VALUE: 0
PIF_VALUE: 1
PIF_VALUE: 0
PIF_VALUE: 2
PIF_VALUE: 0

## 2020-09-01 ASSESSMENT — PAIN SCALES - GENERAL
PAINLEVEL_OUTOF10: 0

## 2020-09-01 ASSESSMENT — PAIN - FUNCTIONAL ASSESSMENT: PAIN_FUNCTIONAL_ASSESSMENT: 0-10

## 2020-09-01 ASSESSMENT — PAIN DESCRIPTION - DESCRIPTORS: DESCRIPTORS: TINGLING

## 2020-09-01 NOTE — PROGRESS NOTES
Ambulatory Surgery/Procedure Discharge Note    Vitals:    09/01/20 1105   BP: 121/83   Pulse: 66   Resp: 14   Temp: 97.5 °F (36.4 °C)   SpO2: 100%       In: 1017 [P.O.:200; I.V.:817]  Out: 500 [Urine:500]    Restroom use offered before discharge. Yes pt up to BR with assistance, voided qs    Pain assessment:  none  Pain Level: 0  Pt brought to [facility] awake and alert. Left foot dressing intact with ace wrap and surgical shoe. Ice bag in place. No c/o pain or nausea. Pt eating crackers and drinking fluids. Instructions given to pt and called to family. Pt up and dressed with assistance. Patient discharged to home/self care.  Patient discharged via wheel chair by transporter to waiting family/S.O.       9/1/2020 12:26 PM

## 2020-09-01 NOTE — ANESTHESIA PRE PROCEDURE
Diagnosis Date    Anxiety and depression     Asthma     Bipolar 1 disorder ([facility]     Cancer ([facility]     pre-cancerous cervical cells     Diabetes mellitus ([facility]     diet  controlled     Hyperlipidemia     Seizures ([facility]     none recently    Thyroid disease        Past Surgical History:        Procedure Laterality Date    LEEP      WISDOM TOOTH EXTRACTION         Social History:    Social History     Tobacco Use    Smoking status: Current Every Day Smoker    Smokeless tobacco: Never Used   Substance Use Topics    Alcohol use: Never     Frequency: Never                                Ready to quit: Not Answered  Counseling given: Not Answered      Vital Signs (Current):   Vitals:    08/28/20 1623 09/01/20 0628   BP:  115/76   Pulse:  60   Resp:  16   Temp:  98.2 °F (36.8 °C)   TempSrc:  Oral   SpO2:  100%   Weight: 131 lb (59.4 kg) 131 lb (59.4 kg)   Height: 5' 4\" (1.626 m) 5' 4\" (1.626 m)                                              BP Readings from Last 3 Encounters:   09/01/20 115/76   06/26/19 106/72   05/29/19 119/88       NPO Status: Time of last liquid consumption: 0430                        Time of last solid consumption: 1930                        Date of last liquid consumption: 09/01/20                        Date of last solid food consumption: 08/31/20    BMI:   Wt Readings from Last 3 Encounters:   09/01/20 131 lb (59.4 kg)   05/29/20 132 lb (59.9 kg)   10/02/19 130 lb (59 kg)     Body mass index is 22.49 kg/m². CBC: No results found for: WBC, RBC, HGB, HCT, MCV, RDW, PLT    CMP: No results found for: NA, K, CL, CO2, BUN, CREATININE, GFRAA, AGRATIO, LABGLOM, GLUCOSE, PROT, CALCIUM, BILITOT, ALKPHOS, AST, ALT    POC Tests: No results for input(s): POCGLU, POCNA, POCK, POCCL, POCBUN, POCHEMO, POCHCT in the last 72 hours.     Coags: No results found for: PROTIME, INR, APTT    HCG (If Applicable):   Lab Results   Component Value Date    PREGTESTUR Negative 09/01/2020        ABGs: No results found for: PHART, PO2ART, LFA2BSA, BNC1CVX, BEART, O9VTSELS     Type & Screen (If Applicable):  No results found for: LABABO, LABRH    Drug/Infectious Status (If Applicable):  No results found for: HIV, HEPCAB    COVID-19 Screening (If Applicable):   Lab Results   Component Value Date    COVID19 NOT DETECTED 08/28/2020         Anesthesia Evaluation  Patient summary reviewed and Nursing notes reviewed  Airway: Mallampati: II  TM distance: >3 FB   Neck ROM: full  Mouth opening: > = 3 FB Dental:          Pulmonary:normal exam  breath sounds clear to auscultation  (+) asthma: seasonal asthma,           Patient did not smoke on day of surgery. Cardiovascular:Negative CV ROS  Exercise tolerance: good (>4 METS),           Rhythm: regular  Rate: normal           Beta Blocker:  Not on Beta Blocker         Neuro/Psych:   (+) psychiatric history: stable with treatment            GI/Hepatic/Renal: Neg GI/Hepatic/Renal ROS            Endo/Other:    (+) DiabetesType II DM, no interval change, , .                 Abdominal:       Abdomen: soft. Vascular: negative vascular ROS. Anesthesia Plan      MAC     ASA 3       Induction: intravenous. MIPS: Postoperative opioids intended and Prophylactic antiemetics administered. Anesthetic plan and risks discussed with patient. Use of blood products discussed with patient whom consented to blood products. Plan discussed with attending and CRNA.     Attending anesthesiologist reviewed and agrees with Pre Eval content              Nette Recio DO   9/1/2020

## 2020-09-01 NOTE — BRIEF OP NOTE
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 METATARSAL HEAD M87.875    Post-Op Diagnosis: Same       Procedure(s):  BIOPRO IMPLANT LEFT 2ND METATARSAL HEAD, SYNOVECTOMY LEFT 2ND METATARSAL PHALANGEAL HEAD    Surgeon(s):  Eber Reed DPM    Assistant:  Resident: JOSEP Stevenson, MS4    Anesthesia: Monitor Anesthesia Care    Injectables: Preop 10mL 0.5% Marcaine plain, Postop 10mL0.5% Marcaine plain, & 2mLDecadron      Hemostasis: Left pneumatic ankle tourniquet, 210 - 65 minutes    Materials: 4-0 Vicryl, 4-0 Nylon, 9.5mm Biopro metal implant      Estimated Blood Loss: less than 50     Complications: None    Specimens:   * No specimens in log *    Implants:  * No implants in log *      Drains: * No LDAs found *    Findings; See op report    Electronically signed by Darling Barker DPM on 9/1/2020 at 9:46 AM

## 2020-09-01 NOTE — OP NOTE
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 LEFT 2ND METATARSAL HEAD M87.875    Post-Op Diagnosis: Same       Procedure(s):BIOPRO IMPLANT LEFT 2ND METATARSAL HEAD, SYNOVECTOMY LEFT 2ND METATARSAL PHALANGEAL HEAD    Surgeon(s): Dwayne Tuttle DPM    Assistant:  Resident: JOSEP Morel, MS4    Anesthesia: Monitor Anesthesia Care    Injectables: Preop 10mL 0.5% Marcaine plain, Postop 10mL0.5% Marcaine plain, & 2mLDecadron      Hemostasis: Left pneumatic ankle tourniquet, 210 - 65 minutes    Materials: 4-0 Vicryl, 4-0 Nylon, 9.5mm Biopro metal implant      Estimated Blood Loss: less than 50     Complications: None    Specimens:   * No specimens in log *    Implants:  * No implants in log *      Drains: * No LDAs found *    Findings; See op report    INDICATIONS FOR PROCEDURE: This patient has signs and symptoms clinically  consistent with the above mentioned preoperative diagnosis. Having failed conservative treatment, it was determined that the patient would benefit from surgical intervention. All potential risks, benefits, and complications were discussed with the patient prior to the scheduling of surgery. All the patient's questions were answered and no guarantees were given. The patient wished to proceed with surgery, and informed written consent was obtained. Detail of Procedure: The patient was brought from the preoperative area and placed on the operating table in the supine position. Following induction of general anesthesia a local block consisting of a total of 10 mL of 0.5% Marcaine to anesthetize the patients surgical limb in a H block fashion to the second metatarsal. Next, applied a well padded pneumatic ankle tourniquet to the patients left lower extremity.  The patients left lower extremity was then scrubbed, sagittal saw with a #138 blade the oblique shaped second metatarsal head was resected to a more flat surface parallel to the proximal phalanx of the second digit. Resected bone was removed from the surgical field and placed to back table tubular use as bone chips. Next, the 2.0 mm guide wire from the Cartiva set was driven into the second metatarsal head using the [place] to guide its position, but was having difficulty due to the cystic changes and the guidewire was shifting more medially. Using intra-operative fluoro, placement was confirmed to be excellent on AP and lateral views. Next, the 10 mm drillbit from the set was employed to drill central aspect of the second metatarsal head. The hole was drilled half way under power and then drilled by hand but encountered slight fracturing at the dorsal aspect of the second metatarsal head at the lip of the previously drilling. After further evaluation of the second metatarsal head the decision was made to not use the Cartiva implant, and switched to the BioPro implant to the proximal phalanx. The second metatarsal head was packed using bone chips saved from previous resected bone. Appropriate contour was noted to the second metatarsal head. Next,  attention was directed towards the dorsal aspect of the previously exposed base of the proximal phalanx second digit. A transverse tenotomy and capsulotomy was created and the medial and lateral collateral ligaments were freed over the base of the proximal phalanx of the second digit. Next, BioPro  sizer was used and was noted 9.5 mm was correct. Next, an oscillating saw was utilized to transect approximately 0.3 mm of the base of the proximal phalanx from dorsal to plantar, medial to lateral, and through and through. The transected bone was removed in total from the surgical site after being freed from all soft tissue attachments and was passed from the operating field to the back table.   Next, using BioPro  punch inserted into the resected base of the second proximal phalanx down to predetermined level. Next, inserted the BioPro 9.5mm implant and was noted to have excellent position. Next, noted improved range of motion to the left second digit. Next, using sharp dissection with a #15 blade we debulked some of the synovitis capsular layer. Next, the surgical site was irrigated with copious amounts of normal saline. Upon completion, weightbearing was stimulated and it was noted that there was adequate correction of the above-mentioned deformity. Next, a medial capsulorrhaphy was used using 4-0 Vicryl in a simple interrupted technique and noted improved tightening of the capsular layer of the second metatarsal.  Next, using 4-0 Vicryl the subcutaneous layer was reapproximated using buried suture technique. Next the skin was reapproximated using 4-0 nylon in a horizontal and simple interrupted technique. End of procedure: At this time, a local anesthetic was injected about the incision sites consisting of 10 mL of 0.5% Marcaine plain and 2 mL of Decadron, for the patient's postoperative comfort. A soft sterile dressing was applied consisting of adaptic, gauze, Valery, cast padding, and Posterior splint. The left pneumatic  ankle tourniquet was deflated after a total time of 65 minutes and a prompt hyperemic response was noted on all aspects of the patient's left lower extremity. The patient tolerated the procedure and anesthesia well. The patient left the OR with vital signs stable and vascular status intact to all remaining digits of the left foot. Following a period of post-operative monitoring, the patient will be discharged home with written and oral wound care and follow-up instructions per Dr. Derek Feliz. The patient is to follow-up with Dr. Derek Feliz in his private office within 3-5 days. The patient is to keep dressing clean, dry and intact at all times.  The patient is to call with if any

## 2020-09-01 NOTE — ANESTHESIA POSTPROCEDURE EVALUATION
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        Anesthesia Post Evaluation    Patient location during evaluation: PACU  Patient participation: complete - patient participated  Level of consciousness: awake  Pain score: 0  Airway patency: patent  Nausea & Vomiting: no nausea and no vomiting  Complications: no  Cardiovascular status: blood pressure returned to baseline  Respiratory status: acceptable  Hydration status: euvolemic

## 2020-09-01 NOTE — PROGRESS NOTES
Pre-Operative Biomechanical Exam      Biomechanics:   Gait Analysis:  Angle of Gait    R: 7 abducted L:  7 abducted     Base of Gait (centimeters)  3.0cm      Stance Phase (any abnormal findings): Left foot second digit pain 2/2 DJD, low medial arch L>R     Swing Phase (any abnormal findings): No abnormal findings     Postural Considerations (limb length/assymetry): No abnormal findings     Ankle Joint: Dorsiflexion (KE):  R: 0  L:  0     Dorsiflexion (KF):  R: 0 L:  0     Subtalar: Inversion:   R:  20  L:  20     Eversion:   R:  8 L:  8     Neutral Position ((inv+ev)/3): R:  8 L:  8      Midtarsal: 1-5 Position:   R: 0  L: 0      First Ray: Dorsiflexion:   R:5mm L: 5mm     Plantarflexion:   R:5mm L: 5mm     First MPJ:  Dorsiflexion (unloaded): R: 65 L: 65     Dorsiflexion (loaded):  R: 45   L: 45     Static Stance: RCSP (calc bisection): R: 2 degrees valgus L: 2 degrees valgus     NCSP: (STJNP + TI)  R: Neutral  L: Neutral     Tibial Influence (Leg to grnd): R: 0 L: 0     Assessment:   Freiberg's second metatarsal head, left foot  DJD second digit, left foot  Equinus, B/L LE  Functional hallux limitus, B/L LE  Pes planus, B/L LE      Plan:  - Patient was seen and examined this AM.  -  BIOPRO IMPLANT LEFT 2ND METATARSAL HEAD, SYNOVECTOMY LEFT 2ND METATARSAL PHALANGEAL HEAD procedure  - This patient has signs and symptoms clinically  consistent with the above mentioned preoperative diagnosis. Having failed conservative treatment, it was determined that the patient would benefit from surgical intervention. All potential risks, benefits, and complications were discussed with the patient prior to the scheduling of surgery. All the patient's questions were answered and no guarantees were given. The patient wished to proceed with surgery, and informed written consent was obtained. - Patient will benefit from a Leal cutout with a functional orthotic.       Emani Chase DPM   Podiatric Resident, PGY-2  Pager: [phone]

## 2020-09-01 NOTE — H&P
Michael Umanzor    [id number]    [facility] Same Day Surgery Update H & P  Department of General Surgery   Surgical Service   Pre-operative History and Physical  Last H & P within the last 30 days. 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 The symptoms have been recalcitrant to conservative treatment and the patient presents today for the above procedure. Covid 19:  Patient denies fever, chills, cough or known exposure to Covid-19.   Patient reports they have been quarantined at home since Covid-19 test.      Past Medical History:        Diagnosis Date    Anxiety and depression     Asthma     Bipolar 1 disorder ([facility]     Cancer ([facility]     pre-cancerous cervical cells     Diabetes mellitus ([facility]     diet  controlled     Hyperlipidemia     Seizures ([facility]     none recently    Thyroid disease      Past Surgical History:        Procedure Laterality Date    LEEP      WISDOM TOOTH EXTRACTION       Past Social History:  Social History     Socioeconomic History    Marital status: Single     Spouse name: None    Number of children: None    Years of education: None    Highest education level: None   Occupational History    None   Social Needs    Financial resource strain: None    Food insecurity     Worry: None     Inability: None    Transportation needs     Medical: None     Non-medical: None   Tobacco Use    Smoking status: Current Every Day Smoker    Smokeless tobacco: Never Used   Substance and Sexual Activity    Alcohol use: Never     Frequency: Never    Drug use: Never    Sexual activity: None   Lifestyle    Physical activity     Days per week: None     Minutes per session: None    Stress: None Relationships    Social connections     Talks on phone: None     Gets together: None     Attends [employer] service: None     Active member of club or organization: None     Attends meetings of clubs or organizations: None     Relationship status: None    Intimate partner violence     Fear of current or ex partner: None     Emotionally abused: None     Physically abused: None     Forced sexual activity: None   Other Topics Concern    None   Social History Narrative    None         Medications Prior to Admission:      Prior to Admission medications    Medication Sig Start Date End Date Taking? Authorizing Provider   Celecoxib (CELEBREX PO) Take by mouth   Yes Historical Provider, MD   fluticasone-salmeterol (ADVAIR) 250-50 MCG/DOSE AEPB Inhale 1 puff into the lungs every 12 hours   Yes Historical Provider, MD   oxyCODONE-acetaminophen (PERCOCET) 5-325 MG per tablet Take 1 tablet by mouth every 8 hours as needed for Pain. Yes Historical Provider, MD   Cyanocobalamin (VITAMIN B 12) 500 MCG TABS Take by mouth daily   Yes Historical Provider, MD   medroxyPROGESTERone (DEPO-PROVERA) 150 MG/ML injection Inject 150 mg into the muscle every 3 months   Yes Historical Provider, MD   albuterol sulfate HFA (VENTOLIN HFA) 108 (90 Base) MCG/ACT inhaler INHALE TWO PUFFS BY MOUTH EVERY 6 HOURS AS NEEDED FOR WHEEZING OR FOR SHORTNESS OF BREATH 4/28/14  Yes Historical Provider, MD   atorvastatin (LIPITOR) 80 MG tablet TAKE ONE TABLET BY MOUTH DAILY 4/25/16  Yes Historical Provider, MD   clonazePAM (KLONOPIN) 0.5 MG tablet TAKE ONE-HALF TO ONE TABLET BY MOUTH UP TO TWO TIMES A DAY AS NEEDED  NO MORE THAN 1.5 TABLETS PER DAY OR 40 PER MONTH 4/24/16 8/28/20 Yes Historical Provider, MD   doxepin (SINEQUAN) 25 MG capsule  6/27/18  Yes Historical Provider, MD   folic acid (FOLVITE) 1 MG tablet Take 2 mg by mouth 3/31/15  Yes Historical Provider, MD   gabapentin (NEURONTIN) 100 MG capsule Take 100 mg by mouth 2 times daily.   5/8/18 8/28/20 Yes Historical Provider, MD   gabapentin (NEURONTIN) 300 MG capsule Take two each evening. 6/6/18  Yes Historical Provider, MD   ipratropium-albuterol (DUONEB) 0.5-2.5 (3) MG/3ML SOLN nebulizer solution INHALE ONE VIAL VIA NEBULIZER BY MOUTH EVERY 6 HOURS AS NEEDED FOR WHEEZING 11/14/16  Yes Historical Provider, MD   levETIRAcetam (KEPPRA) 500 MG tablet TAKE ONE TABLET BY MOUTH TWICE A DAY 3/30/15  Yes Historical Provider, MD   levothyroxine (SYNTHROID) 200 MCG tablet  4/25/16  Yes Historical Provider, MD   lidocaine (LIDODERM) 5 % APPLY ONE PATCH TOPICALLY DAILY FOR 12 HOURS IN A 24 HOUR PERIOD 4/26/18  Yes Historical Provider, MD   liothyronine (CYTOMEL) 25 MCG tablet Take 1 tablet by mouth 6/28/18  Yes Historical Provider, MD   lithium 300 MG capsule Take by mouth every evening  1/25/16  Yes Historical Provider, MD   melatonin 3 MG TABS tablet Take 1 tablet by mouth 11/18/18  Yes Historical Provider, MD   potassium chloride (KLOR-CON M) 20 MEQ extended release tablet TAKE ONE TABLET BY MOUTH TWICE A DAY 5/12/19  Yes Historical Provider, MD   traZODone (DESYREL) 100 MG tablet Take 100 mg by mouth nightly  1/25/16  Yes Historical Provider, MD   calcipotriene (DOVONEX) 0.005 % cream APPLY 1-2 GRAMS TOPICALLY TO AFFECTED AREA 1-2 TIMES PER DAY.  DO NOT APPLY TO FACE. 3/23/18   Historical Provider, MD         Allergies:  Sulfa antibiotics and Food    PHYSICAL EXAM:      /76   Pulse 60   Temp 98.2 °F (36.8 °C) (Oral)   Resp 16   Ht 5' 4\" (1.626 m)   Wt 131 lb (59.4 kg)   SpO2 100%   BMI 22.49 kg/m²      Airway:  Airway patent with no audible stridor    Heart:  Regular rate and rhythm, No murmur noted    Lungs:  No increased work of breathing, good air exchange, clear to auscultation bilaterally, no crackles or wheezing    Abdomen:  Soft, non-distended, non-tender, normal active bowel sounds, no masses palpated    ASSESSMENT AND PLAN    Patient is a 36 y.o. female with above specified procedure planned. 1.  Patient seen and focused exam done today- no new changes since last physical exam on 8/24/20    2. Access to ancillary services are available per request of the provider.     GAYLE Chua - SAHRA     9/1/2020

## 2020-09-01 NOTE — PROGRESS NOTES
PACU Transfer to [facility]    Vitals:    09/01/20 1039   BP:    Pulse: 70   Resp: 12   Temp:    SpO2: 100%         Intake/Output Summary (Last 24 hours) at 9/1/2020 1054  Last data filed at 9/1/2020 1039  Gross per 24 hour   Intake 1017 ml   Output 500 ml   Net 517 ml       Pain assessment:  VS stable. No pain, no nausea. Patient to [facility] bed #5 for discharge. Surgical shoe and crutches with patient. Family called in waiting room.    Pain Level: 0    Patient transferred to care of [facility] RN.    9/1/2020 10:54 AM

## 2022-02-27 NOTE — PROGRESS NOTES
Kenalog:  NDC: [id number]  Lot Number: [id number]  Expiration Date: 4/2020 Patient transported to [address]  02/27/22 0521

## 2024-09-15 NOTE — PROGRESS NOTES
Order review end of shift completed at this time.    Patient admitted to PACU. Post op   Date: 09/01/20  Room / Location: [address] [place] [address] / [facility]    Anesthesia Start: 1564  Anesthesia Stop: 9339    Procedure: BIOPRO IMPLANT LEFT 2ND METATARSAL HEAD, SYNOVECTOMY LEFT 2ND METATARSAL PHALANGEAL HEAD (Left )  Diagnosis:        Freiberg's infraction, left       Other osteonecrosis, left foot ([address]       (FREIBERGS INFARCTION LEFT SECOND MATATARSAL PHALANGEAL HEAD SYNOVITIS LEFT 2ND METATARSAL HEAD M87.875)    Surgeon: Omar Gambino DPM  Responsible Provider: Rubina Hernadez DO     Report received from anesthesia personnel- no problems noted during the case. Patient awake, Left foot/calf wrapped in ace wrap. Patient reports numbness in left foot due to pre op block, toes with good cap refill. Patient voided. Crutches and surgical shoe dispensed. Post op shoe dispensed for left foot- non weight bearing. Post op blood sugar 59- apple juice given, Dr. Novoa Minus anesthesia aware.

## 2024-10-29 RX ORDER — SODIUM CHLORIDE 9 MG/ML
INJECTION, SOLUTION INTRAVENOUS CONTINUOUS
Status: CANCELLED | OUTPATIENT
Start: 2024-10-29

## 2024-10-29 NOTE — PROGRESS NOTES
ENDOSCOPY PREOP INSTRUCTIONS      Please at arrival time given to you from your doctor's office.  Report to the MAIN entrance on [address] and register at the surgery center on the left-hand side of the lobby  You will need your insurance card and photo id and a list of all medications taken on a regular basis. Please include the dose/frequency.    For your procedure:     PLEASE FOLLOW ALL INSTRUCTIONS & PREPS GIVEN TO YOU BY YOUR DOCTOR'S OFFICE.    If you have not received these instructions yet, please call the office immediately. Make sure to read them as soon as received.   If you are taking blood thinners, Aspirin or diabetic medication, make sure to call your doctor as soon as possible for instructions prior to your procedure.  Please dress comfortably and do not wear any lotion, powders or jewelry  If you use oxygen at home, please bring your oxygen tank with you to hospital.  Arrange for someone to be with you and sign you out & drive you home after your procedure.  THIS PERSON MUST WAIT AT HOSPITAL THE ENTIRE TIME.  We allow 2 adult visitors with you in the hospital & masks are strongly recommended.    WOMEN ONLY OF CHILDBEARING AGE: Please make sure to be able to give a urine sample on arrival      If you have further questions, you may contact your Endoscopist's office or Pre Admission Testing staff at [phone]

## 2024-10-30 ENCOUNTER — ANESTHESIA (OUTPATIENT)
Dept: ENDOSCOPY | Age: 44
End: 2024-10-30
Payer: COMMERCIAL

## 2024-10-30 ENCOUNTER — HOSPITAL ENCOUNTER (OUTPATIENT)
Age: 44
Setting detail: OUTPATIENT SURGERY
Discharge: HOME | End: 2024-10-30
Attending: INTERNAL MEDICINE | Admitting: INTERNAL MEDICINE
Payer: COMMERCIAL

## 2024-10-30 ENCOUNTER — ANESTHESIA EVENT (OUTPATIENT)
Dept: ENDOSCOPY | Age: 44
End: 2024-10-30
Payer: COMMERCIAL

## 2024-10-30 ENCOUNTER — APPOINTMENT (OUTPATIENT)
Dept: ENDOSCOPY | Age: 44
End: 2024-10-30
Attending: INTERNAL MEDICINE
Payer: COMMERCIAL

## 2024-10-30 VITALS
DIASTOLIC BLOOD PRESSURE: 66 MMHG | HEIGHT: 64 IN | OXYGEN SATURATION: 100 % | BODY MASS INDEX: 23.9 KG/M2 | SYSTOLIC BLOOD PRESSURE: 100 MMHG | RESPIRATION RATE: 16 BRPM | TEMPERATURE: 97.8 F | HEART RATE: 63 BPM | WEIGHT: 140 LBS

## 2024-10-30 PROCEDURE — 2580000003 HC RX 258: Performed by: ANESTHESIOLOGY

## 2024-10-30 PROCEDURE — 3700000001 HC ADD 15 MINUTES (ANESTHESIA): Performed by: INTERNAL MEDICINE

## 2024-10-30 PROCEDURE — 2500000003 HC RX 250 WO HCPCS: Performed by: NURSE ANESTHETIST, CERTIFIED REGISTERED

## 2024-10-30 PROCEDURE — 6360000002 HC RX W HCPCS: Performed by: NURSE ANESTHETIST, CERTIFIED REGISTERED

## 2024-10-30 PROCEDURE — 7100000010 HC PHASE II RECOVERY - FIRST 15 MIN: Performed by: INTERNAL MEDICINE

## 2024-10-30 PROCEDURE — 3609027000 HC COLONOSCOPY: Performed by: INTERNAL MEDICINE

## 2024-10-30 PROCEDURE — 7100000011 HC PHASE II RECOVERY - ADDTL 15 MIN: Performed by: INTERNAL MEDICINE

## 2024-10-30 PROCEDURE — 3700000000 HC ANESTHESIA ATTENDED CARE: Performed by: INTERNAL MEDICINE

## 2024-10-30 RX ORDER — SODIUM CHLORIDE 9 MG/ML
INJECTION, SOLUTION INTRAVENOUS CONTINUOUS
Status: DISCONTINUED | OUTPATIENT
Start: 2024-10-30 | End: 2024-10-30 | Stop reason: HOSPADM

## 2024-10-30 RX ORDER — LIDOCAINE HYDROCHLORIDE 20 MG/ML
INJECTION, SOLUTION INFILTRATION; PERINEURAL
Status: DISCONTINUED | OUTPATIENT
Start: 2024-10-30 | End: 2024-10-30 | Stop reason: SDUPTHER

## 2024-10-30 RX ORDER — PROPOFOL 10 MG/ML
INJECTION, EMULSION INTRAVENOUS
Status: DISCONTINUED | OUTPATIENT
Start: 2024-10-30 | End: 2024-10-30 | Stop reason: SDUPTHER

## 2024-10-30 RX ADMIN — PROPOFOL 100 MG: 10 INJECTION, EMULSION INTRAVENOUS at 11:24

## 2024-10-30 RX ADMIN — PROPOFOL 100 MG: 10 INJECTION, EMULSION INTRAVENOUS at 11:41

## 2024-10-30 RX ADMIN — PROPOFOL 50 MG: 10 INJECTION, EMULSION INTRAVENOUS at 11:35

## 2024-10-30 RX ADMIN — SODIUM CHLORIDE: 9 INJECTION, SOLUTION INTRAVENOUS at 12:05

## 2024-10-30 RX ADMIN — PROPOFOL 100 MG: 10 INJECTION, EMULSION INTRAVENOUS at 11:31

## 2024-10-30 RX ADMIN — LIDOCAINE HYDROCHLORIDE 50 MG: 20 INJECTION, SOLUTION INFILTRATION; PERINEURAL at 11:32

## 2024-10-30 ASSESSMENT — PAIN SCALES - GENERAL
PAINLEVEL_OUTOF10: 0

## 2024-10-30 ASSESSMENT — PAIN SCALES - WONG BAKER
WONGBAKER_NUMERICALRESPONSE: NO HURT
WONGBAKER_NUMERICALRESPONSE: NO HURT

## 2024-10-30 NOTE — DISCHARGE INSTRUCTIONS
ENDOSCOPY DISCHARGE INSTRUCTIONS:    Call the physician that did your procedure for any questions or concern:    Located within [facility]: [phone]  DR. CINDY SEVILLA        ACTIVITY:    There are potential side effects to the medications used for sedation and anesthesia during your procedure.  These include:  Dizziness or light-headedness, confusion or memory loss, delayed reaction times, loss of coordination, nausea and vomiting.  Because of your increased risk for injury, we ask that you observe the following precautions:  For the next 24 hours,  DO NOT operate an automobile, bicycle, motorcycle, , power tools or large equipment of any kind.  Do not drink alcohol, sign any legal documents or make any legal decisions for 24 hours.  Do not bend your head over lower than your heart.  DO sit on the side of bed/couch awhile before getting up.  Plan on bedrest or quiet relaxation today.  You may resume normal activities in 24 hours.    DIET:    Your first meal today should be light, avoiding spicy and fatty foods.  If you tolerate this first meal, then you may advance to your regular diet unless otherwise advised by your physician.    NORMAL SYMPTOMS:  -Mild sore throat if you’ve had an EGD   -Gaseous discomfort    NOTIFY YOUR PHYSICIAN IF THESE SYMPTOMS OCCUR:  1. Fever (greater than 100)  5. Increased abdominal bloating  2. Severe pain    6. Excessive bleeding  3. Nausea and vomiting  7. Chest pain                                                                    4. Chills    8. Shortness of breath    ADDITIONAL INSTRUCTIONS:    Biopsy results: Call Located within [facility] for biopsy results in 1 week    Educational Information:    Impression:    The prep was fair, scattered liquid stool seen which was washed and suctioned  Small internal hemorrhoids  Normal mucosa seen otherwise throughout the exam up to terminal ileum       Recommendations:    Bowel regimen as recommended.  Metamucil/fiber supplements daily and

## 2024-10-30 NOTE — ANESTHESIA POSTPROCEDURE EVALUATION
Department of Anesthesiology  Postprocedure Note    Patient: Matilde Gleason  MRN: [id number]  YOB: 1980  Date of evaluation: 10/30/2024    Procedure Summary       Date: 10/30/24 Room / Location: [place] [address] / [facility]    Anesthesia Start: 1121 Anesthesia Stop: 1148    Procedure: COLONOSCOPY Diagnosis:       Change in bowel habits      (Change in bowel habits [R19.4])    Surgeons: Jaylene Smith MD Responsible Provider: Eri Pro MD    Anesthesia Type: MAC ASA Status: 3            Anesthesia Type: No value filed.    Onel Phase I: Onel Score: 10    Onel Phase II: Onel Score: 10    Anesthesia Post Evaluation    Patient location during evaluation: PACU  Patient participation: complete - patient participated  Level of consciousness: awake  Pain score: 2  Airway patency: patent  Nausea & Vomiting: no nausea and no vomiting  Cardiovascular status: hemodynamically stable  Respiratory status: acceptable  Hydration status: euvolemic  Pain management: adequate    No notable events documented.

## 2024-10-30 NOTE — PROCEDURES
PROCEDURE NOTE  Date: 10/30/2024   Name: Matilde Gleason  YOB: 1980    Procedures  Colonoscopy                Colonoscopy Procedure Note      Patient: Matilde Gleason  : 1980  Acct#:     Procedure: Colonoscopy     Date:  10/30/2024    Surgeon:  Jaylene Smith MD,     Referring Physician:  Rebeca Wakefield MD      Preoperative Diagnosis:    44-year-old female is here for colonoscopy with complaints of change in bowel habits and intermittent rectal bleeding      Consent:  The patient or their legal guardian has signed a consent, and is aware of the potential risks, benefits, alternatives, and potential complications of this procedure.  These include, but are not limited to hemorrhage, bleeding, post procedural pain, perforation, phlebitis, aspiration, hypotension, hypoxia, cardiovascular events such as arryhthmia, and possibly death.  Additionally, the possibility of missed colonic polyps and interval colon cancer was discussed in the consent.      Anesthesia: MAC anesthesia        Procedure description:   An informed consent was obtained from the patient after explanation of indications, benefits, possible risks and complications of the procedure.  The patient was then taken to the endoscopy suite, placed in the left lateral decubitus position, and the above IV anesthesia was administered.    A digital rectal examination was performed and revealed negative without mass, lesions or tenderness.      The Olympus video colonoscope was placed in the patient's rectum under digital direction and advanced to the cecum. The cecum was identified by IC valve and appendiceal orifice.  The prep was fair.  The ileocecal valve was identified and  intubated.The scope was then withdrawn back through the cecum, ascending, transverse, descending and sigmoid colons.  Careful circumferential examination of the mucosa was performed. The scope was then withdrawn into the rectum and retroflexed.  The scope was

## 2024-10-30 NOTE — ANESTHESIA PRE PROCEDURE
10/30/24 113/83   09/01/20 117/75   09/01/20 121/83       NPO Status:                                                                                 BMI:   Wt Readings from Last 3 Encounters:   10/30/24 63.5 kg (140 lb)   09/01/20 59.4 kg (131 lb)   05/29/20 59.9 kg (132 lb)     Body mass index is 24.03 kg/m².    CBC: No results found for: \"WBC\", \"RBC\", \"HGB\", \"HCT\", \"MCV\", \"RDW\", \"PLT\"    CMP: No results found for: \"NA\", \"K\", \"CL\", \"CO2\", \"BUN\", \"CREATININE\", \"GFRAA\", \"AGRATIO\", \"LABGLOM\", \"GLUCOSE\", \"GLU\", \"CALCIUM\", \"BILITOT\", \"ALKPHOS\", \"AST\", \"ALT\"    POC Tests: No results for input(s): \"POCGLU\", \"POCNA\", \"POCK\", \"POCCL\", \"POCBUN\", \"POCHEMO\", \"POCHCT\" in the last 72 hours.    Coags: No results found for: \"PROTIME\", \"INR\", \"APTT\"    HCG (If Applicable):   Lab Results   Component Value Date    PREGTESTUR Negative 09/01/2020        ABGs: No results found for: \"PHART\", \"PO2ART\", \"FYQ4FRN\", \"SFH4LEP\", \"BEART\", \"C6LIBUQC\"     Type & Screen (If Applicable):  No results found for: \"LABABO\"    Drug/Infectious Status (If Applicable):  No results found for: \"HIV\", \"HEPCAB\"    COVID-19 Screening (If Applicable):   Lab Results   Component Value Date/Time    COVID19 NOT DETECTED 08/28/2020 02:28 PM         Anesthesia Evaluation  Patient summary reviewed and Nursing notes reviewed  Airway: Mallampati: II  TM distance: >3 FB   Neck ROM: full  Mouth opening: > = 3 FB   Dental:          Pulmonary:normal exam  breath sounds clear to auscultation  (+)           asthma: seasonal asthma,           Patient did not smoke on day of surgery.                 Cardiovascular:Negative CV ROS  Exercise tolerance: good (>4 METS)          Rhythm: regular  Rate: normal           Beta Blocker:  Not on Beta Blocker         Neuro/Psych:   (+) psychiatric history: stable with treatment            GI/Hepatic/Renal: Neg GI/Hepatic/Renal ROS            Endo/Other:    (+) DiabetesType II DM, no interval change.                 Abdominal:

## 2024-10-30 NOTE — PROGRESS NOTES
Ambulatory Surgery/Procedure Discharge Note    Vitals:    10/30/24 1235   BP: 100/66   Pulse: 63   Resp: 16   Temp:    SpO2: 100%       In: 740 [P.O.:240; I.V.:500]  Out: - Pt drank juice and received 500 ml bolus of fluids    Restroom use offered before discharge.  Yes    Pain assessment:  none  Pain Level: 0    Pt returned to Endo recovery s/p colonoscopy.  On arrival, pt very drowsy and BP in the 80's.  Dr. Pro anesthesia, consulted and 500 ml saline bolus given.  By discharge, SBP in the 100's, and pt alert; speech clear; breathing easily on RA; drank juice and tolerated well.  Dr. Smith came to bedside to talk with pt's father.    Discharge instructions discussed with pt and her dad, and both verbalized understanding.  IV removed, and dressing applied.    Patient discharged to home/self care. Patient discharged via wheel chair by RN to waiting family/S.O.       10/30/2024 12:56 PM

## (undated) DEVICE — JEWISH HOSPITAL TURNOVER KIT: Brand: MEDLINE INDUSTRIES, INC.

## (undated) DEVICE — SUTURE COAT VCRL SZ 4-0 L18IN ABSRB UD L19MM PS-2 1/2 CIR J496G

## (undated) DEVICE — SUTURE ETHLN SZ 4-0 L18IN NONABSORBABLE BLK L19MM PS-2 3/8 1667H

## (undated) DEVICE — SYRINGE MED 3ML CLR PLAS STD N CTRL LUERLOCK TIP DISP

## (undated) DEVICE — APPLICATOR MEDICATED 26 CC SOLUTION HI LT ORNG CHLORAPREP

## (undated) DEVICE — MICRO SAGITTAL BLADE (9.4 X 0.4 X 26.2MM)

## (undated) DEVICE — GLOVE ORANGE PI 7 1/2   MSG9075

## (undated) DEVICE — K WIRE FIX L6IN DIA0.045IN 1600645] MICROAIRE SURGICAL INSTRUMENTS INC]

## (undated) DEVICE — COVER,MAYO STAND,XL,STERILE: Brand: MEDLINE

## (undated) DEVICE — BLADE SAW OSCIL FLAT 5.5X18.5X0.4MM

## (undated) DEVICE — PODIATRY PK

## (undated) DEVICE — E-Z CLEAN, NON-STICK, PTFE COATED, ELECTROSURGICAL BLADE ELECTRODE, 2.5 INCH (6.35 CM): Brand: EZ CLEAN

## (undated) DEVICE — Device

## (undated) DEVICE — BANDAGE,GAUZE,CONFORMING,4"X75",STRL,LF: Brand: MEDLINE

## (undated) DEVICE — SUTURE ETHLN SZ 3-0 L18IN NONABSORBABLE BLK PS-2 L19MM 3/8 1669H

## (undated) DEVICE — SUTURE VCRL SZ 3-0 L27IN ABSRB UD L19MM PS-2 3/8 CIR PRIM J427H

## (undated) DEVICE — PLATE ES AD W 9FT CRD 2

## (undated) DEVICE — GLOVE ORANGE PI 8   MSG9080

## (undated) DEVICE — SOLUTION IV 1000ML 0.9% SOD CHL